# Patient Record
Sex: FEMALE | Race: BLACK OR AFRICAN AMERICAN | NOT HISPANIC OR LATINO | URBAN - METROPOLITAN AREA
[De-identification: names, ages, dates, MRNs, and addresses within clinical notes are randomized per-mention and may not be internally consistent; named-entity substitution may affect disease eponyms.]

---

## 2017-08-03 ENCOUNTER — RECORDS - HEALTHEAST (OUTPATIENT)
Dept: MAMMOGRAPHY | Facility: CLINIC | Age: 47
End: 2017-08-03

## 2017-08-03 ENCOUNTER — OFFICE VISIT - HEALTHEAST (OUTPATIENT)
Dept: FAMILY MEDICINE | Facility: CLINIC | Age: 47
End: 2017-08-03

## 2017-08-03 DIAGNOSIS — Z12.31 ENCOUNTER FOR SCREENING MAMMOGRAM FOR MALIGNANT NEOPLASM OF BREAST: ICD-10-CM

## 2017-08-03 DIAGNOSIS — Z12.39 SCREENING BREAST EXAMINATION: ICD-10-CM

## 2017-08-03 ASSESSMENT — MIFFLIN-ST. JEOR: SCORE: 1524.95

## 2018-08-08 ENCOUNTER — RECORDS - HEALTHEAST (OUTPATIENT)
Dept: MAMMOGRAPHY | Facility: CLINIC | Age: 48
End: 2018-08-08

## 2018-08-08 ENCOUNTER — OFFICE VISIT - HEALTHEAST (OUTPATIENT)
Dept: FAMILY MEDICINE | Facility: CLINIC | Age: 48
End: 2018-08-08

## 2018-08-08 DIAGNOSIS — Z12.31 ENCOUNTER FOR SCREENING MAMMOGRAM FOR MALIGNANT NEOPLASM OF BREAST: ICD-10-CM

## 2018-08-08 DIAGNOSIS — Z12.31 ENCOUNTER FOR SCREENING MAMMOGRAM FOR BREAST CANCER: ICD-10-CM

## 2018-08-08 ASSESSMENT — MIFFLIN-ST. JEOR: SCORE: 1501.99

## 2020-06-08 ENCOUNTER — OFFICE VISIT - HEALTHEAST (OUTPATIENT)
Dept: FAMILY MEDICINE | Facility: CLINIC | Age: 50
End: 2020-06-08

## 2020-06-08 DIAGNOSIS — Z80.3 FAMILY HISTORY OF MALIGNANT NEOPLASM OF BREAST: ICD-10-CM

## 2020-06-08 DIAGNOSIS — Z12.31 VISIT FOR SCREENING MAMMOGRAM: ICD-10-CM

## 2020-06-08 DIAGNOSIS — R23.4 BREAST SKIN CHANGES: ICD-10-CM

## 2020-06-08 ASSESSMENT — MIFFLIN-ST. JEOR: SCORE: 1512.86

## 2020-06-10 ENCOUNTER — HOSPITAL ENCOUNTER (OUTPATIENT)
Dept: ULTRASOUND IMAGING | Facility: CLINIC | Age: 50
Discharge: HOME | End: 2020-06-10
Attending: FAMILY MEDICINE

## 2020-06-10 ENCOUNTER — HOSPITAL ENCOUNTER (OUTPATIENT)
Dept: MAMMOGRAPHY | Facility: CLINIC | Age: 50
Discharge: HOME | End: 2020-06-10
Attending: FAMILY MEDICINE

## 2020-06-10 DIAGNOSIS — Z80.3 FAMILY HISTORY OF MALIGNANT NEOPLASM OF BREAST: ICD-10-CM

## 2020-06-10 DIAGNOSIS — R23.4 BREAST SKIN CHANGES: ICD-10-CM

## 2021-03-02 ENCOUNTER — RECORDS - HEALTHEAST (OUTPATIENT)
Dept: ADMINISTRATIVE | Facility: OTHER | Age: 51
End: 2021-03-02

## 2021-04-21 ENCOUNTER — COMMUNICATION - HEALTHEAST (OUTPATIENT)
Dept: FAMILY MEDICINE | Facility: CLINIC | Age: 51
End: 2021-04-21

## 2021-05-31 VITALS — WEIGHT: 211.56 LBS | HEIGHT: 62 IN | BODY MASS INDEX: 38.93 KG/M2

## 2021-06-01 VITALS — WEIGHT: 206.5 LBS | HEIGHT: 62 IN | BODY MASS INDEX: 38 KG/M2

## 2021-06-05 VITALS — BODY MASS INDEX: 39.04 KG/M2 | WEIGHT: 210 LBS | HEIGHT: 62 IN | BODY MASS INDEX: 39.04 KG/M2

## 2021-06-08 NOTE — PROGRESS NOTES
"Margo Cowan is a 49 y.o. female here for LELIA screening    ASSESSMENT/PLAN:   Margo was seen today for breast pain.    Diagnoses and all orders for this visit:    Breast skin changes  Family history of malignant neoplasm of breast  Skin changes on exam noted about 1 week ago, two half sisters with breast cancer. Normal mammogram and breast exam 8/2018. Distinct line of tissue causing indentation on left breast. Recommend diagnostic mammogram.   -     Mammo Diagnostic Bilateral; Future      ======================================================    SUBJECTIVE  Margo Cowan is a 49 y.o. female here for breast exam.     Indentation/line seen on left breast about one week ago. No pain, may have had some redness. No weight loss, night sweats, change in appetite, nipple discharge.     She has two half sisters and an aunt with breast cancer. Mom and grandma do not have any cancer. She is a former smoker, quit in 2015.       ROS  Complete 10 point review of systems negative except as noted above in HPI    Reviewed Past Medical History, Medications, Family History and Social History in Epic and up to date with no new changes.    OBJECTIVE  /58   Pulse (!) 116   Temp 98.5  F (36.9  C) (Oral)   Resp 16   Ht 5' 1.5\" (1.562 m)   Wt 210 lb (95.3 kg)   LMP  (LMP Unknown)   SpO2 98%   BMI 39.04 kg/m       General: Cooperative, pleasant, in no acute distress  Neck: no lymphadenopathy, no masses  Skin: fibrocystic breasts bilaterally. No nipple discharge. Vertical line of distinct thicker, superficial tissue that attaches to superficial skin from midclavicular line down left side of breast down to nipple. Seen when patient sitting, not when supine. No lymphadenopathy.  Psych: No suicidal or homicidal ideations, no self-harm.  Normal affect.    LABS & IMAGES   Results for orders placed or performed in visit on 01/06/16   Gynecologic Cytology (PAP Smear)   Result Value Ref Range    Case Report       " Gynecologic Cytology Report                       Case: [id number]                                   --------------------------------------------------------------------------------------------------  Authorizing Provider:  Brian Ramirez MD               Ordering Provider:                                [facility]            Collected:           1/6/2016 1127                                       Medicine/OB                                                                  First Screen:          Jennifercrista [place], [place]    Received:            1/7/2016 0981                                       (ASCP)                                                                                                                                                                           Specimen:    SUREPATH PAP, SCREENING, Endocervical/cervical                                             Interpretation       Negative for squamous intraepithelial lesion or malignancy    Result Flag Normal Normal    Other Findings Trichomonas vaginalis     Specimen Adequacy       Satisfactory for evaluation, endocervical/transformation zone component present    HPV Reflex? Yes regardless of result     HIGH RISK No     Abnormal Bleeding No     Pt Status NA     Birth Control/Hormones None     Prev Abn Date/Dx NA     Cervical Appearance Normal    HPV Cascade (PCR)   Result Value Ref Range    Interpretation No HPV Type(s) Detected No HPV Type(s) Detected, No High Risk HPV Type(s) Detected, DNA Quantity Not Sufficient     Patrick Beck MD, [employer]          ======================================================    Options for treatment and follow-up care were reviewed with the patient. Margo Cowan and/or guardian was engaged and actively involved in the decision making process. Margo Cowan and/or guardian verbalized understanding of the options discussed and was satisfied with the final  plan.      Darryn Keller MD

## 2021-06-12 NOTE — PROGRESS NOTES
"ASSESSMENT and plan  1. Screening breast examination  Examination of the breasts bilaterally was done in the presence of female chaperone.  The examination was unremarkable.  There was no detectable masses, there was no axillary or significant enlargement.  She will be sent for screening mammogram as per the [facility] program.  She has stopped smoking and has one maternal aunt as an undisclosed type of breast cancer she will find out more information and let us know            There are no Patient Instructions on file for this visit.    No orders of the defined types were placed in this encounter.    There are no discontinued medications.    No Follow-up on file.    CHIEF COMPLAINT:  Chief Complaint   Patient presents with     beau program     mammogram       HISTORY OF PRESENT ILLNESS:  Margo is a 46 y.o. female who is here to have a stage mammogram done she reports she has not noticed any abnormalities with her breasts.  She has not been screening herself with exams.  She has a maternal aunt has been diagnosed with breast cancer and had some type of surgery but she is unsure of the type of cancer she had she stopped smoking more than a year ago a    REVIEW OF SYSTEMS:   10 point review of systems negative  All other systems are negative.    PFSH:  No longer smokes    TOBACCO USE:  History   Smoking Status     Former Smoker     Packs/day: 0.25     Types: Cigarettes     Quit date: 1/6/2015   Smokeless Tobacco     Never Used       VITALS:  Vitals:    08/03/17 1423   BP: 114/84   Patient Site: Left Arm   Patient Position: Sitting   Cuff Size: Adult Large   Pulse: 68   Resp: 24   Temp: 98.4  F (36.9  C)   TempSrc: Oral   Weight: 211 lb 9 oz (96 kg)   Height: 5' 1.5\" (1.562 m)     Wt Readings from Last 3 Encounters:   08/03/17 211 lb 9 oz (96 kg)   01/06/16 204 lb 6.4 oz (92.7 kg)   10/26/15 185 lb (83.9 kg)       PHYSICAL EXAM:  Obese -American female sitting comfortably exam room no acute distress  HEENT space " neck supple mucous members moist no lymph enlargement  Skin breasts are symmetrical, no dilated masses scars or sinuses seen on the tissue there is no nipple discharge.  Lymphatic system no axillary lymph node enlargement noted breast exams    DATA REVIEWED:  Additional History from Old Records Summarized (2):   Decision to Obtain Records (1):   Radiology Tests Summarized or Ordered (1):   Labs Reviewed or Ordered (1):   Medicine Test Summarized or Ordered (1):   Independent Review of EKG or X-RAY(2 each):     The visit lasted a total of 15 minutes face to face with the patient. Over 50% of the time was spent counseling and educating the patient about Breast exams.    MEDICATIONS:  Current Outpatient Prescriptions   Medication Sig Dispense Refill     polysaccharide iron complex (NIFEREX) 150 mg iron capsule Take by mouth daily.       No current facility-administered medications for this visit.

## 2021-06-19 NOTE — PROGRESS NOTES
"ASSESMENT AND PLAN:  1. Encounter for screening mammogram for breast cancer  Normal mammogram today.      SUBJECTIVE: Margo Cowan is here for LELIA breast exam.  Normal mammogram in 8/17.  No immediate family with breast cancer, aunt was diagnosed at age 60.   Perform self breast exam at home. No palpable masses.   States she had partial hysterectomy due to bleeding.     No past medical history on file.  There is no problem list on file for this patient.      Allergies:    Allergies   Allergen Reactions     Codeine        History   Smoking Status     Former Smoker     Packs/day: 0.25     Types: Cigarettes     Quit date: 1/6/2015   Smokeless Tobacco     Never Used       Review of systems otherwise negative except as listed in HPI.   History   Smoking Status     Former Smoker     Packs/day: 0.25     Types: Cigarettes     Quit date: 1/6/2015   Smokeless Tobacco     Never Used       OBJECTICE: /82 (Patient Site: Left Arm, Patient Position: Sitting, Cuff Size: Adult Regular)  Pulse 91  Temp 98.1  F (36.7  C) (Oral)   Resp 12  Ht 5' 1.5\" (1.562 m)  Wt 206 lb 8 oz (93.7 kg)  LMP  (LMP Unknown)  SpO2 95%  BMI 38.39 kg/m2    DATA REVIEWED:    Radiology Tests Summarized or Ordered (1): Normal mammogram today.      GEN-alert,  in no apparent distress.  HEENT- neck is supple.  BREASTS- No palpable mass. No nipple discharge.   Lymph node- No palpable axillary L/N          Brian Ramirez   8/8/2018     "

## 2021-06-26 ENCOUNTER — HEALTH MAINTENANCE LETTER (OUTPATIENT)
Age: 51
End: 2021-06-26

## 2021-08-15 ENCOUNTER — HEALTH MAINTENANCE LETTER (OUTPATIENT)
Age: 51
End: 2021-08-15

## 2021-10-11 ENCOUNTER — HEALTH MAINTENANCE LETTER (OUTPATIENT)
Age: 51
End: 2021-10-11

## 2022-01-18 VITALS
OXYGEN SATURATION: 98 % | DIASTOLIC BLOOD PRESSURE: 58 MMHG | TEMPERATURE: 98.5 F | WEIGHT: 210 LBS | HEIGHT: 62 IN | HEART RATE: 116 BPM | BODY MASS INDEX: 38.64 KG/M2 | SYSTOLIC BLOOD PRESSURE: 120 MMHG | RESPIRATION RATE: 16 BRPM

## 2022-08-27 ENCOUNTER — HEALTH MAINTENANCE LETTER (OUTPATIENT)
Age: 52
End: 2022-08-27

## 2023-01-08 ENCOUNTER — HEALTH MAINTENANCE LETTER (OUTPATIENT)
Age: 53
End: 2023-01-08

## 2023-09-23 ENCOUNTER — HEALTH MAINTENANCE LETTER (OUTPATIENT)
Age: 53
End: 2023-09-23